# Patient Record
Sex: MALE | Race: WHITE | URBAN - NONMETROPOLITAN AREA
[De-identification: names, ages, dates, MRNs, and addresses within clinical notes are randomized per-mention and may not be internally consistent; named-entity substitution may affect disease eponyms.]

---

## 2018-01-15 ENCOUNTER — APPOINTMENT (RX ONLY)
Dept: URBAN - NONMETROPOLITAN AREA CLINIC 35 | Facility: CLINIC | Age: 33
Setting detail: DERMATOLOGY
End: 2018-01-15

## 2018-01-15 DIAGNOSIS — Z12.83 ENCOUNTER FOR SCREENING FOR MALIGNANT NEOPLASM OF SKIN: ICD-10-CM

## 2018-01-15 DIAGNOSIS — L73.8 OTHER SPECIFIED FOLLICULAR DISORDERS: ICD-10-CM

## 2018-01-15 DIAGNOSIS — L82.1 OTHER SEBORRHEIC KERATOSIS: ICD-10-CM

## 2018-01-15 DIAGNOSIS — H61.03 CHONDRITIS OF EXTERNAL EAR: ICD-10-CM

## 2018-01-15 DIAGNOSIS — D22 MELANOCYTIC NEVI: ICD-10-CM

## 2018-01-15 PROBLEM — L20.84 INTRINSIC (ALLERGIC) ECZEMA: Status: ACTIVE | Noted: 2018-01-15

## 2018-01-15 PROBLEM — L55.1 SUNBURN OF SECOND DEGREE: Status: ACTIVE | Noted: 2018-01-15

## 2018-01-15 PROBLEM — D22.5 MELANOCYTIC NEVI OF TRUNK: Status: ACTIVE | Noted: 2018-01-15

## 2018-01-15 PROBLEM — L70.0 ACNE VULGARIS: Status: ACTIVE | Noted: 2018-01-15

## 2018-01-15 PROBLEM — H61.033 CHONDRITIS OF EXTERNAL EAR, BILATERAL: Status: ACTIVE | Noted: 2018-01-15

## 2018-01-15 PROBLEM — L85.3 XEROSIS CUTIS: Status: ACTIVE | Noted: 2018-01-15

## 2018-01-15 PROCEDURE — ? EDUCATIONAL RESOURCES PROVIDED

## 2018-01-15 PROCEDURE — ? IN-HOUSE DISPENSING PHARMACY

## 2018-01-15 PROCEDURE — ? COUNSELING

## 2018-01-15 PROCEDURE — 99203 OFFICE O/P NEW LOW 30 MIN: CPT

## 2018-01-15 ASSESSMENT — LOCATION ZONE DERM
LOCATION ZONE: LEG
LOCATION ZONE: TRUNK
LOCATION ZONE: FACE
LOCATION ZONE: EAR

## 2018-01-15 ASSESSMENT — LOCATION SIMPLE DESCRIPTION DERM
LOCATION SIMPLE: LEFT EAR
LOCATION SIMPLE: UPPER BACK
LOCATION SIMPLE: RIGHT FOREHEAD
LOCATION SIMPLE: LEFT THIGH
LOCATION SIMPLE: LEFT FOREHEAD
LOCATION SIMPLE: RIGHT EAR

## 2018-01-15 ASSESSMENT — LOCATION DETAILED DESCRIPTION DERM
LOCATION DETAILED: RIGHT SUPERIOR HELIX
LOCATION DETAILED: RIGHT FOREHEAD
LOCATION DETAILED: LEFT ANTERIOR DISTAL THIGH
LOCATION DETAILED: LEFT SUPERIOR HELIX
LOCATION DETAILED: SUPERIOR THORACIC SPINE
LOCATION DETAILED: LEFT FOREHEAD

## 2018-01-15 NOTE — PROCEDURE: IN-HOUSE DISPENSING PHARMACY
Product 67 Amount/Unit (Numbers Only): 0
Product 21 Application Directions: Use as cleanser 1-2 times daily.
Product 12 Refills: 4
Product 7 Amount/Unit (Numbers Only): 30
Product 7 Application Directions: Apply 1-2 pumps topically to affected areas on feet nightly for 2 weeks. Repeat as needed for flares.
Product 36 Unit Type: mg
Product 1 Application Directions: AAA qhs
Product 9 Units Dispensed: 1
Product 20 Unit Type: ml
Product 17 Price/Unit (In Dollars): 45.00
Product 18 Application Directions: Apply to warts hs
Name Of Product 2: Winston 5% w/ Dap 6% gel\\nDapsone 6%/Niacinamide 2%/Spironolactone 5%
Product 17 Application Directions: Apply to BCC mohs scar qhs
Product 6 Amount/Unit (Numbers Only): 15
Product 1 Price/Unit (In Dollars): 48.00
Name Of Product 9: Clob 0.05% Ointment \\nClobetasol Propionate 0.05%/Niacinamide 4%
Product 21 Price/Unit (In Dollars): 42.00
Name Of Product 10: Desox 0.25% Ointment\\nDesoximetasone 0.25%/Niacinamide 4%
Product 13 Price/Unit (In Dollars): 53.00
Product 14 Application Directions: Apply to rash behind ears bid prn
Product 9 Unit Type: grams
Detail Level: Zone
Product 12 Amount/Unit (Numbers Only): 120
Product 5 Amount/Unit (Numbers Only): 60
Name Of Product 16: Ivermec 1%/ Metron 1% Rosacea gel\\nIvermectin 1%/Metronidazole 1%/Niacinamide 4%/Potassium Azeloyl Diglycinate 5%
Product 21 Refills: 5
Name Of Product 3: BP 5%s/ Tret 0.025% gel\\nBenzoyl Peroxide 5%/Niacinamide 2%/Tretinoin 0.025%
Product 7 Refills: 2
Product 23 Application Directions: Apply topically to the affected areas on body twice daily up to 3 weeks, breaking 1 week before repeating again as needed for flares.
Product 2 Application Directions: Apply 1-2 pumps topically to AAs on face once daily
Name Of Product 6: Anti-Fungal Nail Solution \\nCiclopirox 8%/Fluconazole 1%/Terbinafine 1%
Product 6 Price/Unit (In Dollars): 40.00
Product 10 Refills: 3
Product 5 Refills: 11
Product 22 Application Directions: AAA on lips 1-2 x day M-F with weekends off for 2-3 weeks
Product 6 Application Directions: Apply to affected toenails 1-2 x daily
Name Of Product 14: Hydrocort 2.5%/Keto2% Fungal cream\\nHydrocortisone 2.5%/ Ketoconazole 2%
Name Of Product 17: Scar Silicone Gel\\nPentoxifylline 0.5%/Tranilast 1%/Triamcinolone Acetonide 0.1%/Zinc Oxide 5%
Name Of Product 11: Mometasone 0.1%w/ HA cream\\nHyaluronic Acid 2%/Mometasone Furoate 0.1%/Niacinamide 4%
Product 12 Application Directions: Apply to face and scalp 1-2 times weekly
Name Of Product 12: Anti-Fungal Keto 2% Shampoo\\nKetoconazole 2%/Zinc Pyrithione 1%/Salicylic Acid 2%
Send Charges To Patient Encounter: Yes
Name Of Product 19: Bruise Healing Cream\\nArnica 2%/Ascorbic Acid 20%/Bromelain 5%/Niacinamide 4%/Phytonadione 1%
Product 9 Application Directions: Apply 1-2 pumps topically to affected areas on ears once to twice daily as needed. Avoid face.
Name Of Product 20: Tret 0.05% w/ HA\\nHyaluronic Acid 0.5%/Niacinamide 4%/Tretinoin 0.05%
Name Of Product 22: AK Imiquim 5%/Levocetr 1% gel\\nImiquimod/Levocetrizine 1%/Niacinamide 2%
Product 20 Application Directions: Apply pea sized amount to face nightly as tolerated
Product 3 Application Directions: Apply pea sized amount to face at bedtime as tolerated
Name Of Product 5: Alopecia Topical Solution for Women HP\\nMinoxidel 7%/Progesterone 0.1%
Product 8 Application Directions: Apply to AAs on scalp daily PRN
Product 10 Application Directions: Apply 1-2 pumps topically to the affected areas 1 or 2 times daily for up to 2 weeks at a time
Name Of Product 21: Acne Rosacea Face & Body cleanser\\nSodium Sulfacetamide 8%/Sulfur 4%
Name Of Product 8: Clob 0.05% Topical Solution\\nClobetasol Propionate 0.05%/Niacinamide 4%
Name Of Product 23: Dermatitis Cream\\nTriamcinolone Acetonide 0.1%/Niacinamide 4%
Product 16 Application Directions: Apply 1-2 pumps to face QD-BID
Name Of Product 18: Wart Gel\\nCimetidine 5%/Ibuprofen 2%/Salicylic Acid 17%
Name Of Product 13: [place] 0.1% OIntment\\nNiacinamide 4%/ Tacrolimus 0.1%
Name Of Product 7: Anti-Fungal Econaz 1% Cream\\nEconazole Nitrate 1%/Niacinamide 4%
Name Of Product 4: Alopecia Topical Solution for Men HP\\nFinasteride 0.1%/Minoxidil 7%
Name Of Product 15: Metron 1% Rosacea Gel\\nMetronidazole 1%/Niacinamide 4%
Name Of Product 1: DAP 6% Acne Gel\\nDapsone 6%/Niacinamid 4%
Product 13 Application Directions: Apply 1-2 pumps to AAs on ankle QD-BID or as needed
Product 15 Application Directions: Apply 1-2 pumps topically to affected areas on face 1-2 x daily. Repeating as needed for flares.